# Patient Record
Sex: MALE | Race: WHITE | HISPANIC OR LATINO | Employment: FULL TIME | URBAN - METROPOLITAN AREA
[De-identification: names, ages, dates, MRNs, and addresses within clinical notes are randomized per-mention and may not be internally consistent; named-entity substitution may affect disease eponyms.]

---

## 2020-09-29 ENCOUNTER — APPOINTMENT (OUTPATIENT)
Dept: RADIOLOGY | Facility: AMBULARY SURGERY CENTER | Age: 45
End: 2020-09-29
Attending: ORTHOPAEDIC SURGERY
Payer: COMMERCIAL

## 2020-09-29 VITALS
BODY MASS INDEX: 27.2 KG/M2 | SYSTOLIC BLOOD PRESSURE: 145 MMHG | HEIGHT: 70 IN | WEIGHT: 190 LBS | DIASTOLIC BLOOD PRESSURE: 97 MMHG | TEMPERATURE: 97.2 F

## 2020-09-29 DIAGNOSIS — M79.2 NEUROPATHIC PAIN OF ANKLE, RIGHT: Primary | ICD-10-CM

## 2020-09-29 DIAGNOSIS — R20.0 NUMBNESS OF RIGHT FOOT: ICD-10-CM

## 2020-09-29 DIAGNOSIS — M25.571 PAIN, JOINT, ANKLE AND FOOT, RIGHT: ICD-10-CM

## 2020-09-29 PROCEDURE — 99213 OFFICE O/P EST LOW 20 MIN: CPT | Performed by: ORTHOPAEDIC SURGERY

## 2020-09-29 PROCEDURE — 73610 X-RAY EXAM OF ANKLE: CPT

## 2020-09-29 RX ORDER — TOBRAMYCIN 3 MG/ML
SOLUTION/ DROPS OPHTHALMIC
COMMUNITY
Start: 2020-09-26

## 2020-09-29 RX ORDER — DOXYCYCLINE HYCLATE 100 MG/1
CAPSULE ORAL
COMMUNITY
Start: 2020-09-26

## 2020-09-29 NOTE — PROGRESS NOTES
CONSTANTIN Merino  Attending, Orthopaedic Surgery  Foot and [facility] [address] [facility]        ORTHOPAEDIC FOOT AND ANKLE CLINIC VISIT     Assessment:     Encounter Diagnoses   Name Primary?  Pain, joint, ankle and foot, right     Neuropathic pain of ankle, right Yes    Numbness of right foot               Plan:   · The patient verbalized understanding of exam findings and treatment plan  We engaged in the shared decision-making process and treatment options were discussed at length with the patient  Surgical and conservative management discussed today along with risks and benefits  · Hollie Goodrich has significant weakness in the right ankle, which is contributing to the lateral ligament pain  · We recommend formal PT to focus on ankle strengthening  · We will order an EMG of bilateral lower extremities to evaluate the numbness in his right foot  Return in about 7 weeks (around 11/17/2020)  History of Present Illness:   Chief Complaint:   Chief Complaint   Patient presents with    Right Ankle - Follow-up     Lazaro Bashir is a 54 y o  male who is being seen for right ankle pain and numbness  Patient reports since his most recent surgery in December 2017 to remove his tightrope, he has had diffuse numbness throughout the foot  He did have a nerve block prior to the surgery  He reports ankle pain is localized at anterolateral ankle with minimal radiating and described as sharp and severe  He also reports he feels that the ankle is unstable  Patient has seen pain management at [facility] who 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 and gave him a sympathetic nerve block, which provided no relief  He has also tried acupuncture and medications (lyrica and gabapentin) without relief  Denies history of neuropathy  Patient does not smoke, does not have diabetes and does not take blood thinners    Patient denies family history of anesthesia complications and has not had any complications with anesthesia  Pain/symptom timing:  Worse during the day when active  Pain/symptom context:  Worse with activites and work  Pain/symptom modifying factors:  Rest makes better, activities make worse  Pain/symptom associated signs/symptoms: none    Prior treatment   · NSAIDsYes    · Injections Yes   · Bracing/Orthotics Yes   · Physical Therapy Yes - 6 weeks of PT post-operatively     Orthopedic Surgical History:   July 28, 2017 - Right brostrom and syndesmosis fixation with tightrope    December 1, 2017 - Right ankle removal of tightrope    Past Medical, Surgical and Social History:  Past Medical History:  has no past medical history on file  Problem List: does not have a problem list on file  Past Surgical History:  has a past surgical history that includes Tonsillectomy; Appendectomy; and Ankle surgery  Family History: family history is not on file  Social History:  reports that he has never smoked  He has never used smokeless tobacco  No history on file for alcohol and drug  Current Medications: has a current medication list which includes the following prescription(s): doxycycline hyclate and tobramycin  Allergies: is allergic to cefaclor; metoclopramide; morphine; and clarithromycin       Review of Systems:  General- denies fever/chills  HEENT- denies hearing loss or sore throat  Eyes- denies eye pain or visual disturbances, denies red eyes  Respiratory- denies cough or SOB  Cardio- denies chest pain or palpitations  GI- denies abdominal pain  Endocrine- denies urinary frequency  Urinary- denies pain with urination  Musculoskeletal- Negative except noted above  Skin- denies rashes or wounds  Neurological- denies dizziness or headache  Psychiatric- denies anxiety or difficulty concentrating    Physical Exam:   /97 (BP Location: Right arm, Patient Position: Sitting, Cuff Size: Adult)   Temp (!) 97 2 °F (36 2 °C)   Ht 5' 10" (1 778 m)   Wt 86 2 kg (190 lb)   BMI 27 26 kg/m² General/Constitutional: No apparent distress: well-nourished and well developed  Eyes: normal ocular motion  Cardio: RRR, Normal S1S2, No m/r/g  Lymphatic: No appreciable lymphadenopathy  Respiratory: Non-labored breathing, CTA b/l no w/c/r  Vascular: No edema, swelling or tenderness, except as noted in detailed exam   Integumentary: No impressive skin lesions present, except as noted in detailed exam   Neuro: No ataxia or tremors noted  Psych: Normal mood and affect, oriented to person, place and time  Appropriate affect  Musculoskeletal: Normal, except as noted in detailed exam and in HPI  Examination    Right    Gait Antalgic   Musculoskeletal Tender to palpation at ATFL    Skin Normal       Nails Normal    Range of Motion  10 degrees dorsiflexion, 40 degrees plantarflexion  Subtalar motion: normal    Stability Stable    Muscle Strength 5/5 tibialis anterior  5/5 gastrocnemius-soleus  5/5 posterior tibialis  5/5 peroneal/eversion strength  5/5 EHL  5/5 FHL    Neurologic Normal    Sensation  Intact to light touch throughout sural, saphenous, superficial peroneal, deep peroneal and medial/lateral plantar nerve distributions  Orange-Luis Alberto 5 07 filament (10g) testing  deferred  Cardiovascular Brisk capillary refill < 2 seconds,intact DP and PT pulses    Special Tests Negative anterior drawer      Imaging Studies:   3 views of the right ankle were taken, reviewed and interpreted independently that demonstrate previous ghost track from the syndesmosis fixation, no other notable finding  Reviewed by me personally  Scribe Attestation    I,:   Amadeo Villagran PA-C am acting as a scribe while in the presence of the attending physician :        I,:   Elias Kaiser MD personally performed the services described in this documentation    as scribed in my presence :              Therese Schneider Lachman, MD  Foot & Ankle Surgery   Department [address]      I personally performed the service  Marlena Slay Lachman, MD

## 2020-09-30 NOTE — TELEPHONE ENCOUNTER
Patient sees Dr Ria Culver  [facility], PT script, and EMG order to be faxed to [phone]        Faxed per request

## 2020-11-17 VITALS
SYSTOLIC BLOOD PRESSURE: 137 MMHG | HEIGHT: 70 IN | DIASTOLIC BLOOD PRESSURE: 93 MMHG | WEIGHT: 208 LBS | BODY MASS INDEX: 29.78 KG/M2 | TEMPERATURE: 96.7 F | HEART RATE: 65 BPM

## 2020-11-17 DIAGNOSIS — M79.2 NEUROPATHIC PAIN OF ANKLE, RIGHT: Primary | ICD-10-CM

## 2020-11-17 DIAGNOSIS — M25.571 PAIN, JOINT, ANKLE AND FOOT, RIGHT: ICD-10-CM

## 2020-11-17 PROCEDURE — 99213 OFFICE O/P EST LOW 20 MIN: CPT | Performed by: ORTHOPAEDIC SURGERY

## 2020-12-08 ENCOUNTER — PROCEDURE VISIT (OUTPATIENT)
Dept: NEUROLOGY | Facility: CLINIC | Age: 45
End: 2020-12-08
Payer: OTHER MISCELLANEOUS

## 2020-12-08 DIAGNOSIS — M79.2 NEUROPATHIC PAIN OF ANKLE, RIGHT: ICD-10-CM

## 2020-12-08 DIAGNOSIS — R20.0 NUMBNESS OF RIGHT FOOT: ICD-10-CM

## 2020-12-08 PROCEDURE — 95886 MUSC TEST DONE W/N TEST COMP: CPT | Performed by: PHYSICAL MEDICINE & REHABILITATION

## 2020-12-08 PROCEDURE — 95911 NRV CNDJ TEST 9-10 STUDIES: CPT | Performed by: PHYSICAL MEDICINE & REHABILITATION

## 2020-12-15 VITALS
HEART RATE: 79 BPM | BODY MASS INDEX: 29.78 KG/M2 | HEIGHT: 70 IN | WEIGHT: 208 LBS | SYSTOLIC BLOOD PRESSURE: 149 MMHG | DIASTOLIC BLOOD PRESSURE: 98 MMHG

## 2020-12-15 DIAGNOSIS — R20.0 NUMBNESS OF RIGHT FOOT: ICD-10-CM

## 2020-12-15 DIAGNOSIS — G54.9 SENSORY NEURONOPATHY: Primary | ICD-10-CM

## 2020-12-15 PROCEDURE — 99213 OFFICE O/P EST LOW 20 MIN: CPT | Performed by: ORTHOPAEDIC SURGERY

## 2020-12-22 ENCOUNTER — TELEPHONE (OUTPATIENT)
Dept: NEUROLOGY | Facility: CLINIC | Age: 45
End: 2020-12-22

## 2021-03-22 ENCOUNTER — CONSULT (OUTPATIENT)
Dept: NEUROLOGY | Facility: CLINIC | Age: 46
End: 2021-03-22
Payer: OTHER MISCELLANEOUS

## 2021-03-22 ENCOUNTER — TELEPHONE (OUTPATIENT)
Dept: NEUROLOGY | Facility: CLINIC | Age: 46
End: 2021-03-22

## 2021-03-22 VITALS
BODY MASS INDEX: 30.64 KG/M2 | HEIGHT: 70 IN | DIASTOLIC BLOOD PRESSURE: 92 MMHG | SYSTOLIC BLOOD PRESSURE: 138 MMHG | WEIGHT: 214 LBS | HEART RATE: 78 BPM

## 2021-03-22 DIAGNOSIS — R20.0 NUMBNESS OF RIGHT FOOT: ICD-10-CM

## 2021-03-22 DIAGNOSIS — R20.0 NUMBNESS AND TINGLING OF RIGHT LEG: ICD-10-CM

## 2021-03-22 DIAGNOSIS — R20.2 NUMBNESS AND TINGLING OF RIGHT LEG: ICD-10-CM

## 2021-03-22 PROCEDURE — 99244 OFF/OP CNSLTJ NEW/EST MOD 40: CPT | Performed by: PSYCHIATRY & NEUROLOGY

## 2021-03-22 RX ORDER — DULOXETIN HYDROCHLORIDE 20 MG/1
20 CAPSULE, DELAYED RELEASE ORAL DAILY
Qty: 30 CAPSULE | Refills: 5 | Status: SHIPPED | OUTPATIENT
Start: 2021-03-22

## 2021-03-22 RX ORDER — CICLOPIROX 1 G/100ML
SHAMPOO TOPICAL 3 TIMES WEEKLY
COMMUNITY
Start: 2021-02-04

## 2021-03-22 NOTE — PROGRESS NOTES
Aye Blackwell is a 54 y o  male  Chief Complaint   Patient presents with    Sensory neuronopathy       Assessment:  1  Numbness and tingling of right leg    2  Numbness of right foot        Plan:   triphasic bone scan    blood work for neuropathy   Cymbalta 20 mg at nighttime   follow-up in 2-3 months    Discussion:   patient's recent EMG results were reviewed, there is diffuse sensory neuropathy with predominant axonal changes, would recommend triphasic bone scan to rule out complex regional pain syndrome, also would recommend blood work for neuropathy, also would recommend blood work, patient to call me after the test to discuss the results, he feels physical therapy with massage therapy helps in and hence a prescription was given, also  We discussed different medication options, he has failed  Lyrica and Neurontin secondary to side effect profile, I have advised him to try Cymbalta side effects of the medication discussed with the patient if he develops any side effects then he will stop the medication, if the above blood work and  Is negative then would recommend patient to see a neuromuscular specialist for a 2nd opinion, to go to the hospital if has any worsening symptoms and call me otherwise to see me back in 2-3 months and follow up with his other physicians      Subjective:    HPI    patient is here for evaluation of numbness and tingling sensation and pain in the right lower extremity mostly from the knee down into the foot, he had work related injury and had surgery to his right ankle about 4 years ago in 2017, since then he has been having numbness and tingling sensation of the right leg involving the foot and the leg up to the right knee associated with pain and numbness and tingling sensation, he denies any focal weakness, symptoms are bothersome to him he has tried pain management including a sympathetic block without much relief according to the patient massage therapy did help him, he did try Lyrica and gabapentin but could not tolerate them secondary to side effects, he denies any symptoms in the left lower extremity or bilateral upper extremities, no bowel and bladder incontinence, no other complaints  Vitals:    03/22/21 0915   BP: 138/92   BP Location: Left arm   Patient Position: Sitting   Cuff Size: Adult   Pulse: 78   Weight: 97 1 kg (214 lb)   Height: 5' 10" (1 778 m)       Current Medications    Current Outpatient Medications:     Ciclopirox 1 % shampoo, 3 (three) times a week, Disp: , Rfl:     doxycycline hyclate (VIBRAMYCIN) 100 mg capsule, , Disp: , Rfl:     tobramycin (TOBREX) 0 3 % SOLN, , Disp: , Rfl:       Allergies  Cefaclor, Metoclopramide, Morphine, Clarithromycin, and Food allergy  [apple flavor]    Past Medical History  History reviewed  No pertinent past medical history  Past Surgical History:  Past Surgical History:   Procedure Laterality Date    ANKLE SURGERY      APPENDECTOMY      TONSILLECTOMY           Family History:  Family History   Problem Relation Age of Onset    Multiple sclerosis Mother     Hypertension Father     Lung cancer Maternal Grandfather        Social History:   reports that he has never smoked  He has never used smokeless tobacco  He reports current alcohol use  He reports that he does not use drugs  I have reviewed the past medical history, surgical history, social and family history, current medications, allergies vitals, review of systems, and updated this information as appropriate today  Objective:    Physical Exam    Neurological Exam    GENERAL:  Cooperative in no acute distress  Well-developed and well-nourished    HEAD and NECK   Head is atraumatic normocephalic with no lesions or masses  Neck is supple with full range of motion    CARDIOVASCULAR  Carotid Arteries-no carotid bruits      NEUROLOGIC:  Mental Status-the patient is awake alert and oriented without aphasia or apraxia  Cranial Nerves: Visual fields are full to confrontation  Visual acuity is 20/20 in  Both eyes with hand-held chart  Extraocular movements are full without nystagmus  Pupils are 2-1/2 mm and reactive  Face is symmetrical to light touch  Movements of facial expression move symmetrically  Hearing is normal to finger rub bilaterally  Soft palate lifts symmetrically  Shoulder shrug is symmetrical  Tongue is midline without atrophy  Motor: No drift is noted on arm extension  Strength is full in the upper and lower extremities with normal bulk and tone  Sensory: Intact to temperature and vibratory sensation in the upper and lower extremities bilaterally  Except for decreased temperature sensation in bilateral lower extremities in a stocking distribution Cortical function is intact  Coordination: Finger to nose testing is performed accurately  Romberg is negative  Gait reveals a normal base with symmetrical arm swing  Tandem walk is normal   Reflexes:     2+ and symmetrical   Toes are downgoing   no spine tenderness          ROS:  Review of Systems   Constitutional: Negative for appetite change, fatigue and fever  HENT: Negative for drooling, ear pain, tinnitus, trouble swallowing and voice change  Eyes: Negative for photophobia, pain and visual disturbance  Respiratory: Negative for chest tightness and shortness of breath  Cardiovascular: Negative for chest pain, palpitations and leg swelling  Gastrointestinal: Negative for abdominal pain, constipation, diarrhea, nausea and vomiting  Endocrine: Negative for cold intolerance and heat intolerance  Genitourinary: Negative for difficulty urinating, frequency and urgency  Musculoskeletal: Negative for back pain, gait problem, joint swelling, myalgias and neck pain  Skin: Negative for rash  Neurological: Positive for numbness (And tingling on right leg)  Negative for dizziness, tremors, seizures, syncope, facial asymmetry, speech difficulty, weakness, light-headedness and headaches  Psychiatric/Behavioral: Negative for agitation, behavioral problems, confusion, decreased concentration, dysphoric mood and sleep disturbance  The patient is not nervous/anxious and is not hyperactive

## 2021-03-22 NOTE — TELEPHONE ENCOUNTER
TOÑO request received from [employer] - faxed to [facility] and scanned into Media

## 2021-03-23 ENCOUNTER — EVALUATION (OUTPATIENT)
Dept: PHYSICAL THERAPY | Age: 46
End: 2021-03-23
Payer: OTHER MISCELLANEOUS

## 2021-03-23 DIAGNOSIS — R20.2 NUMBNESS AND TINGLING OF RIGHT LEG: ICD-10-CM

## 2021-03-23 DIAGNOSIS — R20.0 NUMBNESS OF RIGHT FOOT: Primary | ICD-10-CM

## 2021-03-23 DIAGNOSIS — R20.0 NUMBNESS AND TINGLING OF RIGHT LEG: ICD-10-CM

## 2021-03-23 PROCEDURE — 97161 PT EVAL LOW COMPLEX 20 MIN: CPT | Performed by: PHYSICAL THERAPIST

## 2021-03-23 PROCEDURE — 97110 THERAPEUTIC EXERCISES: CPT | Performed by: PHYSICAL THERAPIST

## 2021-03-23 PROCEDURE — 97140 MANUAL THERAPY 1/> REGIONS: CPT | Performed by: PHYSICAL THERAPIST

## 2021-03-23 NOTE — PROGRESS NOTES
PT Evaluation     Today's date: 3/23/2021  Patient name: Merari Rosales  :   MRN: [id number]  Referring provider: Nae Morales MD  Dx:   Encounter Diagnosis     ICD-10-CM    1  Numbness of right foot  R20 0 PT plan of care cert/re-cert   2  Numbness and tingling of right leg  R20 0 Ambulatory referral to Physical Therapy    R20 2 PT plan of care cert/re-cert       Start Time: 0930  Stop Time: 1020  Total time in clinic (min): 50 minutes    Assessment  Assessment details: Merari Rosales is a 54 y o  male who presents with complaints of Numbness of right foot  (primary encounter diagnosis)  Numbness and tingling of right leg  No further referral appears necessary at this time based upon examination results  Patient is presenting with sciatic nerve irritation leading to numbness/tingling lower aspect of leg limiting walking and causing sleep disturbances  Patient educated on findings and role of therapy  Prognosis is good given HEP compliance and PT 1-2/wk  Positive prognostic indicators include positive attitude toward recovery  Please contact me if you have any questions or recommendations  Thank you for the opportunity to share in [facility]'s care       Impairments: abnormal or restricted ROM, abnormal movement and lacks appropriate home exercise program    Symptom irritability: lowUnderstanding of Dx/Px/POC: good   Prognosis: good    Plan  Patient would benefit from: skilled physical therapy  Planned therapy interventions: joint mobilization, manual therapy, patient education, postural training, strengthening, stretching, therapeutic activities, therapeutic exercise, home exercise program, neuromuscular re-education, flexibility, functional ROM exercises, abdominal trunk stabilization and balance  Frequency: 2x week  Duration in weeks: 8  Treatment plan discussed with: patient        Subjective Evaluation    History of Present Illness  Date of onset: 3/29/2017  Mechanism of injury: Patient jumped as he was chasing a thalia landing 6 feet off ground  Went to [facility] doctor who referred patient to podiatrist then referred to PT for 6 weeks without any improvement  Was then referred to orthopedic surgeon referred him back to therapy for another month without any symptom improvements  Was then referred for MRI  Eventually had surgery and had tightrope procedure performed and then eventually removed  Was casted for 3 months  Sutures eventually removed  Severe pain with suture removal  Reports numbness from fibular head down to top of foot  Had acupunture without improvement nor with sympathetic nerve block to lumbar region  Reports 2-3 days relief following massage but was temporary  Feels better with moving around  Sleeping has improved but will wake patient in middle of night  Patient denies bowel/bladder dysfunction, saddle paraesthesias, nor cough/sneeze provocation  Recurrent probem    Quality of life: good    Pain  No pain reported  Quality: sharp, burning, needle-like and knife-like (swelling)  Aggravating factors: standing, walking and sitting  Progression: no change    Treatments  Previous treatment: medication, physical therapy, immobilization and massage  Current treatment: physical therapy  Patient Goals  Patient goal: decrease numbness/tingling        Objective    GAIT:wnl  Squat assess: wnl  SLS: RLE: n/t, LLE: n/t           MMT    Hip         R          L   Flex  5 5   Extn  5 5   Abd  5 5   Add  5 5                 MMT         AROM    Knee      R          L         R          L   Flex  5 5 WNL WNL   Extn  5 5 WNL WNL                       MMT          AROM    Ankle         R          L          R         L   PF 4 5 WFL WFL   DF  5 5 WFL WFL   DF bent knee 5 5     EHL 5 5     Inv  5 5     Ever   5 5     Foot Int  4 5         Palpation: TTP sciatic nerve, tibial nerve, sural nerve  Sensation (pinprick):  (L/R): intact equal dermatomes L2-S2  Sensation: hyperesthesia sural nerve, peroneal nerve  Reflexes:  (L/R) L4: 2+ BLE       S1:  2+ BLE          Babinski=   -   Clonus= -    Slump test: L= -    R=   + (foot symptoms)    Straight leg raise:   L=  -  R= + tibial nerve bias and sural nerve bias    Ankle:  joint findings= deferred    Flowsheet Rows      Most Recent Value   PT/OT G-Codes   Current Score  60   Projected Score  67        Precautions: chronic nerve symptoms    Manuals 3 23            Central gliders L5-S1 FB                                                   Neuro Re-Ed                                                    Ther Ex             Pt edu  FB            Tibial tensioners 2x10                                                                                          Ther Activity                                       Gait Training                                       Modalities                                         Short Term:  1  Pt will demonstrate improved strength by 1/2 grade in 4 weeks  2  Pt will be able to test negative lower limb tension test in 4 weeks  3  Pt will be able to walk > 20 minutes without symptoms in 4 weeks  Long Term:   1  Pt will be independent in their HEP in 8 weeks  2  Pt will be pain free with IADL's in 8 weeks  3  Pt will demonstrate improved FOTO, > 7 in 8 weeks  4  Pt will report no symptoms with sleeping in 8 weeks

## 2021-03-26 ENCOUNTER — OFFICE VISIT (OUTPATIENT)
Dept: PHYSICAL THERAPY | Age: 46
End: 2021-03-26
Payer: OTHER MISCELLANEOUS

## 2021-03-26 DIAGNOSIS — R20.0 NUMBNESS OF RIGHT FOOT: ICD-10-CM

## 2021-03-26 DIAGNOSIS — R20.2 NUMBNESS AND TINGLING OF RIGHT LEG: Primary | ICD-10-CM

## 2021-03-26 DIAGNOSIS — R20.0 NUMBNESS AND TINGLING OF RIGHT LEG: Primary | ICD-10-CM

## 2021-03-26 PROCEDURE — 97110 THERAPEUTIC EXERCISES: CPT | Performed by: PHYSICAL THERAPIST

## 2021-03-26 PROCEDURE — 97140 MANUAL THERAPY 1/> REGIONS: CPT | Performed by: PHYSICAL THERAPIST

## 2021-03-26 NOTE — PROGRESS NOTES
Daily Note     Today's date: 3/26/2021  Patient name: Beck Sims  :   MRN: [id number]  Referring provider: Donato Schmitt MD  Dx:   Encounter Diagnosis     ICD-10-CM    1  Numbness and tingling of right leg  R20 0     R20 2    2  Numbness of right foot  R20 0        Start Time: 0930  Stop Time: 1005  Total time in clinic (min): 35 minutes    Subjective: Patient reports that leg felt better after last session  Reports achiness in right hip today and numbness in foot  Objective: See treatment diary below    Assessment: Tolerated treatment well  Patient reported less numbness/tingling post treatment today  Plan: Continue per plan of care  Precautions: chronic nerve symptoms     Manuals 3 23  3 26                   Central gliders L5-S1 FB  FB                    tibial tens  In S/L    FB                   Navicular tape   FB                                           Neuro Re-Ed                                                                                               Ther Ex                       Pt edu   FB                     Tibial tensioners 2x10  2x10 DF/PF in supine                   recum bike    8'                                                                                                                                           Ther Activity                                                                       Gait Training                                                                       Modalities

## 2021-03-29 ENCOUNTER — HOSPITAL ENCOUNTER (OUTPATIENT)
Dept: NUCLEAR MEDICINE | Facility: HOSPITAL | Age: 46
Discharge: HOME | End: 2021-03-29
Attending: PSYCHIATRY & NEUROLOGY
Payer: OTHER MISCELLANEOUS

## 2021-03-29 ENCOUNTER — HOSPITAL ENCOUNTER (OUTPATIENT)
Dept: NUCLEAR MEDICINE | Facility: HOSPITAL | Age: 46
Discharge: HOME | End: 2021-03-29
Attending: PSYCHIATRY & NEUROLOGY

## 2021-03-29 DIAGNOSIS — R20.2 NUMBNESS AND TINGLING OF RIGHT LEG: ICD-10-CM

## 2021-03-29 DIAGNOSIS — R20.0 NUMBNESS AND TINGLING OF RIGHT LEG: ICD-10-CM

## 2021-03-29 PROCEDURE — 78315 BONE IMAGING 3 PHASE: CPT

## 2021-03-29 PROCEDURE — G1004 CDSM NDSC: HCPCS

## 2021-03-29 PROCEDURE — A9503 TC99M MEDRONATE: HCPCS

## 2021-03-30 ENCOUNTER — OFFICE VISIT (OUTPATIENT)
Dept: PHYSICAL THERAPY | Age: 46
End: 2021-03-30
Payer: OTHER MISCELLANEOUS

## 2021-03-30 DIAGNOSIS — R20.0 NUMBNESS OF RIGHT FOOT: ICD-10-CM

## 2021-03-30 DIAGNOSIS — R20.2 NUMBNESS AND TINGLING OF RIGHT LEG: Primary | ICD-10-CM

## 2021-03-30 DIAGNOSIS — R20.0 NUMBNESS AND TINGLING OF RIGHT LEG: Primary | ICD-10-CM

## 2021-03-30 PROCEDURE — 97110 THERAPEUTIC EXERCISES: CPT | Performed by: PHYSICAL THERAPIST

## 2021-03-30 PROCEDURE — 97140 MANUAL THERAPY 1/> REGIONS: CPT | Performed by: PHYSICAL THERAPIST

## 2021-03-30 NOTE — PROGRESS NOTES
Daily Note     Today's date: 3/30/2021  Patient name: Kasey Jo  :   MRN: [id number]  Referring provider: León Donato MD  Dx:   Encounter Diagnosis     ICD-10-CM    1  Numbness and tingling of right leg  R20 0     R20 2    2  Numbness of right foot  R20 0        Start Time: 0845        Subjective: Patient reports that leg is doing about the same  Felt better after tape  Reports leg feels stiff today  Objective: See treatment diary below    Assessment: Tolerated treatment fair  Slight improvement in dorsal aspect of the foot symptoms  Plan: Continue per plan of care  Precautions: chronic nerve symptoms     Manuals 3 23  3 26  3 30      Central gliders L5-S1 FB  FB  FB       tibial tens  In S/L    FB  FB      Navicular tape   FB  FB                   Neuro Re-Ed                                                   Ther Ex            Pt edu   FB          Tibial tensioners 2x10  2x10 DF/PF in supine  common peroneal 2x10      recum bike    8'  10'       vigor gym      5', lvl 8                                                          Ther Activity                                      Gait Training                                      Modalities

## 2021-04-01 ENCOUNTER — OFFICE VISIT (OUTPATIENT)
Dept: PHYSICAL THERAPY | Age: 46
End: 2021-04-01
Payer: OTHER MISCELLANEOUS

## 2021-04-01 DIAGNOSIS — R20.2 NUMBNESS AND TINGLING OF RIGHT LEG: Primary | ICD-10-CM

## 2021-04-01 DIAGNOSIS — R20.0 NUMBNESS OF RIGHT FOOT: ICD-10-CM

## 2021-04-01 DIAGNOSIS — R20.0 NUMBNESS AND TINGLING OF RIGHT LEG: Primary | ICD-10-CM

## 2021-04-01 PROCEDURE — 97140 MANUAL THERAPY 1/> REGIONS: CPT | Performed by: PHYSICAL THERAPIST

## 2021-04-01 PROCEDURE — 97110 THERAPEUTIC EXERCISES: CPT | Performed by: PHYSICAL THERAPIST

## 2021-04-01 NOTE — PROGRESS NOTES
Daily Note     Today's date: 2021  Patient name: Donnie Peters  :   MRN: [id number]  Referring provider: David Marvin MD  Dx:   Encounter Diagnosis     ICD-10-CM    1  Numbness and tingling of right leg  R20 0     R20 2    2  Numbness of right foot  R20 0        Start Time: 1445  Stop Time: 1530  Total time in clinic (min): 45 minutes  Subjective: Patient reports that he is having cramping at times in the bottom of his foot  Objective: See treatment diary below    Assessment: Tolerated treatment well  Reported absence of symptoms following treatment today with NP-STM  Plan: Continue per plan of care  Precautions: chronic nerve symptoms     Manuals 3 23  3 26  3 30  4 2    Central gliders L5-S1 FB  FB  FB       tibial tens  In S/L    FB  FB  FB    Navicular tape   FB  FB      NP-STM sciatic/peroneal    FB                                 Neuro Re-Ed                                                   Ther Ex            Pt edu   FB          Tibial tensioners 2x10  2x10 DF/PF in supine  common peroneal 2x10      recum bike    8'  10'  10'     vigor gym      5', lvl 8  5', lvl 9                                                        Ther Activity                                      Gait Training                                      Modalities

## 2021-04-06 ENCOUNTER — OFFICE VISIT (OUTPATIENT)
Dept: PHYSICAL THERAPY | Age: 46
End: 2021-04-06
Payer: OTHER MISCELLANEOUS

## 2021-04-06 DIAGNOSIS — R20.0 NUMBNESS AND TINGLING OF RIGHT LEG: Primary | ICD-10-CM

## 2021-04-06 DIAGNOSIS — R20.2 NUMBNESS AND TINGLING OF RIGHT LEG: Primary | ICD-10-CM

## 2021-04-06 DIAGNOSIS — R20.0 NUMBNESS OF RIGHT FOOT: ICD-10-CM

## 2021-04-06 PROCEDURE — 97140 MANUAL THERAPY 1/> REGIONS: CPT | Performed by: PHYSICAL THERAPIST

## 2021-04-06 PROCEDURE — 97110 THERAPEUTIC EXERCISES: CPT | Performed by: PHYSICAL THERAPIST

## 2021-04-06 NOTE — PROGRESS NOTES
Daily Note     Today's date: 2021  Patient name: Kandi Barreto  :   MRN: [id number]  Referring provider: Andrzej Otoole MD  Dx:   Encounter Diagnosis     ICD-10-CM    1  Numbness and tingling of right leg  R20 0     R20 2    2  Numbness of right foot  R20 0                 Subjective: Patient reports that he has not been having cramping in his foot lately but has been having right gluteal discomfort and water sensation in his foot  Reported feeling better after last treatment  Objective: See treatment diary below    Assessment: Tolerated treatment well  Reported absence of symptoms following treatment today with NP-STM  Still presenting with significant neural tension RLE  Plan: Continue per plan of care  Precautions: chronic nerve symptoms     Manuals 3 23  3 26  3 30  4 2 4 6   Central gliders L5-S1 FB  FB  FB   FB    tibial tens  In S/L    FB  FB  FB FB   Navicular tape   FB  FB      NP-STM sciatic/peroneal    FB FB                                Neuro Re-Ed                                                   Ther Ex            Pt edu   FB          Tibial tensioners 2x10  2x10 DF/PF in supine  common peroneal 2x10   2*10 sliders   recum bike    8'  10'  10' 10'    vigor gym      5', lvl 8  5', lvl 9     eversion         BTB- HEP                                          Ther Activity                                      Gait Training                                      Modalities

## 2021-04-08 ENCOUNTER — OFFICE VISIT (OUTPATIENT)
Dept: PHYSICAL THERAPY | Age: 46
End: 2021-04-08
Payer: OTHER MISCELLANEOUS

## 2021-04-08 DIAGNOSIS — R20.2 NUMBNESS AND TINGLING OF RIGHT LEG: Primary | ICD-10-CM

## 2021-04-08 DIAGNOSIS — R20.0 NUMBNESS OF RIGHT FOOT: ICD-10-CM

## 2021-04-08 DIAGNOSIS — R20.0 NUMBNESS AND TINGLING OF RIGHT LEG: Primary | ICD-10-CM

## 2021-04-08 PROCEDURE — 97110 THERAPEUTIC EXERCISES: CPT | Performed by: PHYSICAL THERAPIST

## 2021-04-08 PROCEDURE — 97140 MANUAL THERAPY 1/> REGIONS: CPT | Performed by: PHYSICAL THERAPIST

## 2021-04-08 NOTE — PROGRESS NOTES
Daily Note     Today's date: 2021  Patient name: Donnie Peters  : [id number]  MRN: [id number]  Referring provider: David Marvin MD  Dx:   Encounter Diagnosis     ICD-10-CM    1  Numbness and tingling of right leg  R20 0     R20 2    2  Numbness of right foot  R20 0        Start Time:   Stop Time:   Total time in clinic (min): 45 minutes  Subjective: Patient reports that he has not been having cramping in his foot lately but has been having right gluteal discomfort and water sensation in his foot  Reported feeling better after last treatment  Objective: See treatment diary below    Assessment: Tolerated treatment well  Reported feeling better post treatment  Plan: Continue per plan of care  Precautions: chronic nerve symptoms     Manuals 3 23  3 26  3 30  4 2 4 6 4 8   Central gliders L5-S1 FB  FB  FB   FB FB    tibial tens  In S/L    FB  FB  FB FB FB   Navicular tape   FB  FB       NP-STM sciatic/peroneal    FB FB FB                                   Neuro Re-Ed                                                       Ther Ex             Pt edu   FB           Tibial tensioners 2x10  2x10 DF/PF in supine  common peroneal 2x10   2*10 sliders 2*10 sliders   recum bike    8'  10'  10' 10' 10'    vigor gym      5', lvl 8  5', lvl 9      eversion         BTB- HEP                                              Ther Activity                                         Gait Training                                         Modalities

## 2021-04-13 ENCOUNTER — OFFICE VISIT (OUTPATIENT)
Dept: PHYSICAL THERAPY | Age: 46
End: 2021-04-13
Payer: OTHER MISCELLANEOUS

## 2021-04-13 DIAGNOSIS — R20.2 NUMBNESS AND TINGLING OF RIGHT LEG: Primary | ICD-10-CM

## 2021-04-13 DIAGNOSIS — R20.0 NUMBNESS AND TINGLING OF RIGHT LEG: Primary | ICD-10-CM

## 2021-04-13 DIAGNOSIS — R20.0 NUMBNESS OF RIGHT FOOT: ICD-10-CM

## 2021-04-13 PROCEDURE — 97110 THERAPEUTIC EXERCISES: CPT | Performed by: PHYSICAL THERAPIST

## 2021-04-13 PROCEDURE — 97140 MANUAL THERAPY 1/> REGIONS: CPT | Performed by: PHYSICAL THERAPIST

## 2021-04-13 NOTE — PROGRESS NOTES
Daily Note     Today's date: 2021  Patient name: Dimitri Larose  : [birth year]  MRN: [id number]  Referring provider: Destiny Gómez MD  Dx:   Encounter Diagnosis     ICD-10-CM    1  Numbness and tingling of right leg  R20 0     R20 2    2  Numbness of right foot  R20 0                 Subjective: Patient reports having gluteal discomfort entering treatment with pain in left foot  Objective: See treatment diary below    Assessment: Patient reports that he felt better post treatment  Educated on exercise performance at home to avoid stressing sciatic nerve  Plan: Continue per plan of care  Precautions: chronic nerve symptoms     Manuals  3 30  4 2 4 6 4 8 4 13   Central gliders L5-S1  FB   FB FB FB    tibial tens   In S/L  FB  FB FB FB FB   Navicular tape  FB        NP-STM sciatic/peroneal  FB FB FB FB                      Neuro Re-Ed                                           Ther Ex       4 13   Pt edu           Tibial tensioners  common peroneal 2x10   2*10 sliders 2*10 sliders 2*10 tensioner   recum bike   10'  10' 10' 10' 10'    vigor gym  5', lvl 8  5', lvl 9       eversion     BTB- HEP                                      Ther Activity                                Gait Training                                Modalities                           See result note

## 2021-04-15 ENCOUNTER — OFFICE VISIT (OUTPATIENT)
Dept: PHYSICAL THERAPY | Age: 46
End: 2021-04-15
Payer: OTHER MISCELLANEOUS

## 2021-04-15 DIAGNOSIS — R20.0 NUMBNESS OF RIGHT FOOT: ICD-10-CM

## 2021-04-15 DIAGNOSIS — R20.0 NUMBNESS AND TINGLING OF RIGHT LEG: Primary | ICD-10-CM

## 2021-04-15 DIAGNOSIS — R20.2 NUMBNESS AND TINGLING OF RIGHT LEG: Primary | ICD-10-CM

## 2021-04-15 PROCEDURE — 97110 THERAPEUTIC EXERCISES: CPT | Performed by: PHYSICAL THERAPIST

## 2021-04-15 PROCEDURE — 97140 MANUAL THERAPY 1/> REGIONS: CPT | Performed by: PHYSICAL THERAPIST

## 2021-04-15 NOTE — PROGRESS NOTES
Daily Note     Today's date: 4/15/2021  Patient name: Neema Mclaughlin  :   MRN: [id number]  Referring provider: Rhea Mcnamara MD  Dx:   Encounter Diagnosis     ICD-10-CM    1  Numbness and tingling of right leg  R20 0     R20 2    2  Numbness of right foot  R20 0        Start Time:   Stop Time: 1900  Total time in clinic (min): 45 minutes  Subjective: Patient reports not having gluteal discomfort entering treatment today  Reports foot is doing okay with numbness in dorsum of foot  Objective: See treatment diary below    Assessment: Patient reported feeling better in his foot post treatment  Improved gait pattern demonstrated  Plan: Continue per plan of care  Precautions: chronic nerve symptoms     Manuals  3 30  4 2 4 6 4 8 4 13 4 15   Central gliders L5-S1  FB   FB FB FB FB    tibial tens   In S/L  FB  FB FB FB FB FB   Navicular tape  FB         NP-STM sciatic/peroneal  FB FB FB FB FB   rocktape sciatic nerve      FB               Neuro Re-Ed                                               Ther Ex       4 13    Pt edu            Tibial tensioners  common peroneal 2x10   2*10 sliders 2*10 sliders 2*10 tensioner    recum bike   10'  10' 10' 10' 10' 10'    vigor gym  5', lvl 8  5', lvl 9        eversion     BTB- HEP                                          Ther Activity                                   Gait Training                                   Modalities

## 2021-04-20 ENCOUNTER — OFFICE VISIT (OUTPATIENT)
Dept: PHYSICAL THERAPY | Age: 46
End: 2021-04-20
Payer: OTHER MISCELLANEOUS

## 2021-04-20 DIAGNOSIS — R20.0 NUMBNESS AND TINGLING OF RIGHT LEG: Primary | ICD-10-CM

## 2021-04-20 DIAGNOSIS — R20.2 NUMBNESS AND TINGLING OF RIGHT LEG: Primary | ICD-10-CM

## 2021-04-20 DIAGNOSIS — R20.0 NUMBNESS OF RIGHT FOOT: ICD-10-CM

## 2021-04-20 PROCEDURE — 97140 MANUAL THERAPY 1/> REGIONS: CPT | Performed by: PHYSICAL THERAPIST

## 2021-04-20 PROCEDURE — 97110 THERAPEUTIC EXERCISES: CPT | Performed by: PHYSICAL THERAPIST

## 2021-04-20 NOTE — PROGRESS NOTES
Daily Note     Today's date: 2021  Patient name: Clari Kennedy  : [birth year]  MRN: [id number]  Referring provider: Suzanne Gastelum MD  Dx:   Encounter Diagnosis     ICD-10-CM    1  Numbness and tingling of right leg  R20 0     R20 2    2  Numbness of right foot  R20 0                 Subjective: Patient reports that symptoms were absent for 2 days with taping intervention after last visit  Objective: See treatment diary below    Assessment: Patient reported feeling less tingling in symptoms post treatment  Plan: Continue per plan of care  Precautions: chronic nerve symptoms     Manuals 4 6 4 8 4 13 4 15 4 20   Central gliders L5-S1 FB FB FB FB     tibial tens  In S/L FB FB FB FB FB   Navicular tape        NP-STM sciatic/peroneal FB FB FB FB FB   rocktape sciatic nerve    FB FB            Neuro Re-Ed                                   Ther Ex   4 13     Pt edu          Tibial tensioners 2*10 sliders 2*10 sliders 2*10 tensioner  2*10 tensioner   recum bike  10' 10' 10' 10' 10'    vigor gym         eversion BTB- HEP                                  Ther Activity                          Gait Training                          Modalities

## 2021-04-22 ENCOUNTER — OFFICE VISIT (OUTPATIENT)
Dept: PHYSICAL THERAPY | Age: 46
End: 2021-04-22
Payer: OTHER MISCELLANEOUS

## 2021-04-22 DIAGNOSIS — R20.0 NUMBNESS OF RIGHT FOOT: ICD-10-CM

## 2021-04-22 DIAGNOSIS — R20.0 NUMBNESS AND TINGLING OF RIGHT LEG: Primary | ICD-10-CM

## 2021-04-22 DIAGNOSIS — R20.2 NUMBNESS AND TINGLING OF RIGHT LEG: Primary | ICD-10-CM

## 2021-04-22 PROCEDURE — 97140 MANUAL THERAPY 1/> REGIONS: CPT | Performed by: PHYSICAL THERAPIST

## 2021-04-22 PROCEDURE — 97110 THERAPEUTIC EXERCISES: CPT | Performed by: PHYSICAL THERAPIST

## 2021-04-22 NOTE — PROGRESS NOTES
Daily Note     Today's date: 2021  Patient name: Sesar Pelletier  : [birth year]  MRN: [id number]  Referring provider: Terese Kasper MD  Dx:   Encounter Diagnosis     ICD-10-CM    1  Numbness and tingling of right leg  R20 0     R20 2    2  Numbness of right foot  R20 0        Start Time: 0850  Stop Time: 930  Total time in clinic (min): 40 minutes  Subjective: Continues to report temporary relief with treatment  Symptoms return when tape is off, he will be buying cupping kit to use at home  Objective: See treatment diary below    Assessment: Patient reported no symptoms post treatment with manual intervention  Plan: Continue per plan of care  Precautions: chronic nerve symptoms     Manuals 4 13 4 15 4 20 4 22   Central gliders L5-S1 FB FB      tibial tens  In S/L FB FB FB FB   Navicular tape       NP-STM sciatic/peroneal FB FB FB FB   rocktape sciatic nerve  FB FB FB    IASTM peroneal n     FB   Neuro Re-Ed                               Ther Ex 4 13      Pt edu         Tibial tensioners 2*10 tensioner  2*10 tensioner    recum bike  10' 10' 10' 10'                                         Ther Activity                       Gait Training                       Modalities

## 2021-04-27 ENCOUNTER — OFFICE VISIT (OUTPATIENT)
Dept: PHYSICAL THERAPY | Age: 46
End: 2021-04-27
Payer: OTHER MISCELLANEOUS

## 2021-04-27 DIAGNOSIS — R20.2 NUMBNESS AND TINGLING OF RIGHT LEG: Primary | ICD-10-CM

## 2021-04-27 DIAGNOSIS — R20.0 NUMBNESS OF RIGHT FOOT: ICD-10-CM

## 2021-04-27 DIAGNOSIS — R20.0 NUMBNESS AND TINGLING OF RIGHT LEG: Primary | ICD-10-CM

## 2021-04-27 PROCEDURE — 97110 THERAPEUTIC EXERCISES: CPT | Performed by: PHYSICAL THERAPIST

## 2021-04-27 PROCEDURE — 97140 MANUAL THERAPY 1/> REGIONS: CPT | Performed by: PHYSICAL THERAPIST

## 2021-04-27 NOTE — PROGRESS NOTES
Daily Note     Today's date: 2021  Patient name: Delfina Calabrese  :   MRN: [id number]  Referring provider: Taisha Wilkins MD  Dx:   Encounter Diagnosis     ICD-10-CM    1  Numbness and tingling of right leg  R20 0     R20 2    2  Numbness of right foot  R20 0        Start Time: 1800  Stop Time: 1845  Total time in clinic (min): 45 minutes    Subjective: Pt reports improvement with symptoms  Objective: See treatment diary below      Assessment: Tolerated treatment well  Manual added to include lumbar gapping on right side to relieve neural tension  Pt experienced positive results  Patient demonstrated fatigue post treatment and would benefit from continued PT      Plan: Continue per plan of care  Manuals 4 27   4 22   Central gliders L5-S1 JF with lumbar gapping       tibial tens  In S/L JF   FB   Navicular tape       NP-STM sciatic/peroneal JF   FB   rocktape sciatic nerve JF   FB    IASTM peroneal n  JF   FB   Neuro Re-Ed                               Ther Ex 4 27      Pt edu         Tibial tensioners 2*10 tensioner      recum bike  10'   10'                                         Ther Activity                       Gait Training                       Modalities

## 2021-04-29 ENCOUNTER — OFFICE VISIT (OUTPATIENT)
Dept: PHYSICAL THERAPY | Age: 46
End: 2021-04-29
Payer: OTHER MISCELLANEOUS

## 2021-04-29 DIAGNOSIS — R20.0 NUMBNESS OF RIGHT FOOT: ICD-10-CM

## 2021-04-29 DIAGNOSIS — R20.0 NUMBNESS AND TINGLING OF RIGHT LEG: Primary | ICD-10-CM

## 2021-04-29 DIAGNOSIS — R20.2 NUMBNESS AND TINGLING OF RIGHT LEG: Primary | ICD-10-CM

## 2021-04-29 PROCEDURE — 97140 MANUAL THERAPY 1/> REGIONS: CPT | Performed by: PHYSICAL THERAPIST

## 2021-04-29 PROCEDURE — 97110 THERAPEUTIC EXERCISES: CPT | Performed by: PHYSICAL THERAPIST

## 2021-04-29 NOTE — PROGRESS NOTES
Daily Note     Today's date: 2021  Patient name: Bird Casey  :   MRN: [id number]  Referring provider: Ashleigh Marshall MD  Dx:   Encounter Diagnosis     ICD-10-CM    1  Numbness and tingling of right leg  R20 0     R20 2    2  Numbness of right foot  R20 0        Start Time: 1800  Stop Time: 1848  Total time in clinic (min): 48 minutes    Subjective: Pt reports improvement in symptoms following treatment but symptoms return 2 days later  Objective: See treatment diary below    Assessment: Tolerated treatment well  Patient reported feeling better post session and educated on use of NP-STM at home  Plan: Continue per plan of care  Manuals 4 27 4 29  4 22   Central gliders L5-S1 JF with lumbar gapping FB       tibial tens  In S/L JF FB  FB   Navicular tape       NP-STM sciatic/peroneal JF FB  FB   rocktape sciatic nerve JF FB  FB    IASTM peroneal n  JF FB  FB   Neuro Re-Ed                               Ther Ex 4 27 4 29     Pt edu         Tibial tensioners 2*10 tensioner 2*10 tensioner     recum bike  10' 10'  10'                                         Ther Activity                       Gait Training                       Modalities

## 2021-05-06 ENCOUNTER — OFFICE VISIT (OUTPATIENT)
Dept: PHYSICAL THERAPY | Age: 46
End: 2021-05-06
Payer: OTHER MISCELLANEOUS

## 2021-05-06 DIAGNOSIS — R20.0 NUMBNESS OF RIGHT FOOT: ICD-10-CM

## 2021-05-06 DIAGNOSIS — R20.2 NUMBNESS AND TINGLING OF RIGHT LEG: Primary | ICD-10-CM

## 2021-05-06 DIAGNOSIS — R20.0 NUMBNESS AND TINGLING OF RIGHT LEG: Primary | ICD-10-CM

## 2021-05-06 PROCEDURE — 97140 MANUAL THERAPY 1/> REGIONS: CPT | Performed by: PHYSICAL THERAPIST

## 2021-05-06 PROCEDURE — 97112 NEUROMUSCULAR REEDUCATION: CPT | Performed by: PHYSICAL THERAPIST

## 2021-05-06 NOTE — PROGRESS NOTES
Daily Note     Today's date: 2021  Patient name: Judith Cortez  :   MRN: [id number]  Referring provider: Rajesh Valdes MD  Dx:   Encounter Diagnosis     ICD-10-CM    1  Numbness and tingling of right leg  R20 0     R20 2    2  Numbness of right foot  R20 0        Start Time: 1800  Stop Time: 1850  Total time in clinic (min): 50 minutes    Subjective: Pt reports right gluteal discomfort entering treatment with fatigue in right foot with ADLs  Objective: See treatment diary below  SLS: RLE < 15 sec   7 single leg calf raise with correct form, 20 LLE      Assessment: Tolerated treatment fair  Moderate sway and ankle strategy demonstrated with single leg based exercises  Fatigue noted with calf raise exercise  Plan: Continue per plan of care  Manuals 4 27 4 29 5 6  4 22   Central gliders L5-S1 JF with lumbar gapping FB  FB      tibial tens  In S/L JF FB FB  FB   Navicular tape        NP-STM sciatic/peroneal JF FB   FB   rocktape sciatic nerve JF FB   FB    IASTM peroneal n  JF FB FB  FB   Neuro Re-Ed        Fwd heel/side tap   2*10     rockerboard   2*10 A/P lvl 2     rockerboard   2*10 m/l lvl 2     S/L deadlift   2*10                               Ther Ex 4 27 4 29 5 6     Pt edu          Tibial tensioners 2*10 tensioner 2*10 tensioner      recum bike  10' 10'   10'                                              Ther Activity                          Gait Training                          Modalities

## 2021-05-11 ENCOUNTER — OFFICE VISIT (OUTPATIENT)
Dept: PHYSICAL THERAPY | Age: 46
End: 2021-05-11
Payer: OTHER MISCELLANEOUS

## 2021-05-11 DIAGNOSIS — R20.0 NUMBNESS OF RIGHT FOOT: ICD-10-CM

## 2021-05-11 DIAGNOSIS — R20.0 NUMBNESS AND TINGLING OF RIGHT LEG: Primary | ICD-10-CM

## 2021-05-11 DIAGNOSIS — R20.2 NUMBNESS AND TINGLING OF RIGHT LEG: Primary | ICD-10-CM

## 2021-05-11 PROCEDURE — 97140 MANUAL THERAPY 1/> REGIONS: CPT

## 2021-05-11 NOTE — PROGRESS NOTES
Daily Note     Today's date: 2021  Patient name: Vince Zarco  :   MRN: [id number]  Referring provider: Margot Mujica MD  Dx:   Encounter Diagnosis     ICD-10-CM    1  Numbness and tingling of right leg  R20 0     R20 2    2  Numbness of right foot  R20 0                   Subjective: pt reports he has more pain in his R leg than he had previously, pt reports no increased activity over the weekend, pt reports PAS 6/10 over the weekend and now a dull ache, pt reports he didn't have the pain when his leg was taped,  "It feels like a knot in there"    Objective: See treatment diary below      Assessment: pt had some relief with manual intervention today, will cont to monitor sx and progress functional ex as mone      Plan: Continue per plan of care  Progress treatment as tolerated  Manuals 4 27 4 29 5 6 21   Central gliders L5-S1 JF with lumbar gapping FB  FB     tibial tens  In S/L JF FB FB VK   Navicular tape       NP-STM sciatic/peroneal JF FB     rocktape sciatic nerve JF FB  FB   IASTM R proximal hamstring insertion    VK   Sciatic nerve glides    VK    IASTM peroneal n  JF FB FB    Neuro Re-Ed       Fwd heel/side tap   2*10    rockerboard   2*10 A/P lvl 2    rockerboard   2*10 m/l lvl 2    S/L deadlift   2*10    Standing short foot        ball toss rebounder                Ther Ex 4 27 4 29 5 6    Pt edu         Tibial tensioners 2*10 tensioner 2*10 tensioner     recum bike  10' 10'     BOSU squats                                      Ther Activity                       Gait Training                       Modalities

## 2021-05-13 ENCOUNTER — OFFICE VISIT (OUTPATIENT)
Dept: PHYSICAL THERAPY | Age: 46
End: 2021-05-13
Payer: OTHER MISCELLANEOUS

## 2021-05-13 DIAGNOSIS — R20.0 NUMBNESS AND TINGLING OF RIGHT LEG: Primary | ICD-10-CM

## 2021-05-13 DIAGNOSIS — R20.2 NUMBNESS AND TINGLING OF RIGHT LEG: Primary | ICD-10-CM

## 2021-05-13 DIAGNOSIS — R20.0 NUMBNESS OF RIGHT FOOT: ICD-10-CM

## 2021-05-13 PROCEDURE — 97112 NEUROMUSCULAR REEDUCATION: CPT | Performed by: PHYSICAL THERAPIST

## 2021-05-13 PROCEDURE — 97140 MANUAL THERAPY 1/> REGIONS: CPT | Performed by: PHYSICAL THERAPIST

## 2021-05-13 NOTE — PROGRESS NOTES
Daily Note     Today's date: 2021  Patient name: Vince Zarco  : [birth year]  MRN: [id number]  Referring provider: Margot Mujica MD  Dx:   Encounter Diagnosis     ICD-10-CM    1  Numbness and tingling of right leg  R20 0     R20 2    2  Numbness of right foot  R20 0                   Subjective: pt reports back of leg is doing better today  Reports right sided low back discomfort entering session and he removed tape at noon and numbness returned in leg  Symptoms relief lasts about 2-3 days at a time  Objective: See treatment diary below    Assessment: Patient reported no symptoms post treatment today  Cramping occurred with short foot exercise and cueing needed to avoid compensation  Plan: Continue per plan of care  Progress treatment as tolerated  Patient provided verbal consent to treatment plan, grade 5 joint mobilization, and recommended interventions  Manuals 4 27 4 29 5 6 21   Central gliders L5-S1 JF with lumbar gapping FB  FB      tibial tens  In S/L JF FB FB VK FB   NP-STM sciatic/peroneal JF FB      rocktape sciatic nerve JF FB  FB FB   IASTM R proximal hamstring insertion    VK    Sciatic nerve glides    VK FB   L5 gr  5     FB    IASTM peroneal n  JF FB FB     Neuro Re-Ed        Fwd heel/side tap   2*10  2*10   rockerboard   2*10 A/P lvl 2     rockerboard   2*10 m/l lvl 2     S/L deadlift   2*10     Standing short foot     2*10, 10'' hold            Ther Ex 4 27 4 29 5 6  5 13   Pt edu          Tibial tensioners 2*10 tensioner 2*10 tensioner      recum bike  10' 10'   5'   BOSU squats                                           Ther Activity                          Gait Training                          Modalities

## 2021-05-18 ENCOUNTER — OFFICE VISIT (OUTPATIENT)
Dept: PHYSICAL THERAPY | Age: 46
End: 2021-05-18
Payer: OTHER MISCELLANEOUS

## 2021-05-18 DIAGNOSIS — R20.2 NUMBNESS AND TINGLING OF RIGHT LEG: Primary | ICD-10-CM

## 2021-05-18 DIAGNOSIS — R20.0 NUMBNESS AND TINGLING OF RIGHT LEG: Primary | ICD-10-CM

## 2021-05-18 DIAGNOSIS — R20.0 NUMBNESS OF RIGHT FOOT: ICD-10-CM

## 2021-05-18 PROCEDURE — 97110 THERAPEUTIC EXERCISES: CPT

## 2021-05-18 PROCEDURE — 97140 MANUAL THERAPY 1/> REGIONS: CPT

## 2021-05-18 PROCEDURE — 97112 NEUROMUSCULAR REEDUCATION: CPT

## 2021-05-18 NOTE — PROGRESS NOTES
Daily Note     Today's date: 2021  Patient name: Salina Willard  :   MRN: [id number]  Referring provider: Riley Pratt MD  Dx:   Encounter Diagnosis     ICD-10-CM    1  Numbness and tingling of right leg  R20 0     R20 2    2  Numbness of right foot  R20 0                   Subjective: pt reports he has relief after treatment which lasts 2-3 days    Objective: See treatment diary below  Pt ed:pt/girlfriend were educated on kinesiotaping for sciatic nerve unloading      Assessment: decreased sx noted after treatment, pt able to perform short foot with less cramping with visual/tactile cueing, pt/girlfriend have good understanding of kinesiotape application      Plan: Continue per plan of care  Progress treatment as tolerated  Manuals 4 29 5 6 21   Central gliders L5-S1 FB  FB       tibial tens  In S/L FB FB VK FB FB   NP-STM sciatic/peroneal FB       rocktape sciatic nerve FB  FB FB FB   IASTM R proximal hamstring insertion   VK     Sciatic nerve glides   VK FB FB   L5 gr  5    FB FB    IASTM peroneal n  FB FB      Neuro Re-Ed     21   Fwd heel/side tap  2*10  2*10 2x10   rockerboard  2*10 A/P lvl 2      rockerboard  2*10 m/l lvl 2      S/L deadlift  2*10      Standing short foot    2*10, 10'' hold [id number]"    SLS rebounder ball toss     3x30" ea   Ther Ex 4 29 5 6  5 13 21   Pt edu          Tibial tensioners 2*10 tensioner       recum bike  10'   5' 5'    BOSU squats     10x8"                                      Ther Activity                          Gait Training                          Modalities

## 2021-05-20 ENCOUNTER — OFFICE VISIT (OUTPATIENT)
Dept: PHYSICAL THERAPY | Age: 46
End: 2021-05-20
Payer: OTHER MISCELLANEOUS

## 2021-05-20 DIAGNOSIS — R20.0 NUMBNESS AND TINGLING OF RIGHT LEG: Primary | ICD-10-CM

## 2021-05-20 DIAGNOSIS — R20.0 NUMBNESS OF RIGHT FOOT: ICD-10-CM

## 2021-05-20 DIAGNOSIS — R20.2 NUMBNESS AND TINGLING OF RIGHT LEG: Primary | ICD-10-CM

## 2021-05-20 PROCEDURE — 97112 NEUROMUSCULAR REEDUCATION: CPT | Performed by: PHYSICAL THERAPIST

## 2021-05-20 PROCEDURE — 97140 MANUAL THERAPY 1/> REGIONS: CPT | Performed by: PHYSICAL THERAPIST

## 2021-05-20 PROCEDURE — 97110 THERAPEUTIC EXERCISES: CPT | Performed by: PHYSICAL THERAPIST

## 2021-05-20 NOTE — PROGRESS NOTES
Plan of care    Today's date: 2021  Patient name: Karishma Vogt  :   MRN: [id number]  Referring provider: Yusuf Jose MD  Dx:   Encounter Diagnosis     ICD-10-CM    1  Numbness and tingling of right leg  R20 0     R20 2    2  Numbness of right foot  R20 0        Start Time: 0930  Stop Time: 1015  Total time in clinic (min): 45 minutes    Subjective: pt reports he is feeling pretty good lately with less heaviness in his foot  Objective: See treatment diary below  Pt ed:pt/girlfriend were educated on kinesiotaping for sciatic nerve unloading    Assessment: Patient reported feeling good post treatment  Fatigue noted with single leg based exercise  Plan: 8 weeks, 2 visits per week  Manuals 5 6 21   Central gliders L5-S1 FB        tibial tens  In S/L FB VK FB FB FB   NP-STM sciatic/peroneal     FB   rocktape sciatic nerve  FB FB FB FB   IASTM R proximal hamstring insertion  VK      Sciatic nerve glides  VK FB FB FB   L5 gr  5   FB FB     IASTM peroneal n  FB    FB   Neuro Re-Ed    21   Fwd heel/side tap 2*10  2*10 2x10    rockerboard 2*10 A/P lvl 2       rockerboard 2*10 m/l lvl 2       S/L deadlift 2*10       Standing short foot   2*10, 10'' hold [id number]" 2*10, 10''    SLS rebounder ball toss    3x30 3*30    Ther Ex 5 6  5 13 21   Pt edu          Tibial tensioners        recum bike    5' 5'  5'   BOSU squats    10x8" 2*10, 10"                                      Ther Activity                          Gait Training                          Modalities

## 2021-05-25 ENCOUNTER — OFFICE VISIT (OUTPATIENT)
Dept: PHYSICAL THERAPY | Age: 46
End: 2021-05-25
Payer: OTHER MISCELLANEOUS

## 2021-05-25 DIAGNOSIS — R20.2 NUMBNESS AND TINGLING OF RIGHT LEG: Primary | ICD-10-CM

## 2021-05-25 DIAGNOSIS — R20.0 NUMBNESS OF RIGHT FOOT: ICD-10-CM

## 2021-05-25 DIAGNOSIS — R20.0 NUMBNESS AND TINGLING OF RIGHT LEG: Primary | ICD-10-CM

## 2021-05-25 PROCEDURE — 97112 NEUROMUSCULAR REEDUCATION: CPT | Performed by: PHYSICAL THERAPIST

## 2021-05-25 PROCEDURE — 97110 THERAPEUTIC EXERCISES: CPT | Performed by: PHYSICAL THERAPIST

## 2021-05-25 PROCEDURE — 97140 MANUAL THERAPY 1/> REGIONS: CPT | Performed by: PHYSICAL THERAPIST

## 2021-05-25 NOTE — PROGRESS NOTES
Plan of care    Today's date: 2021  Patient name: Kandi Barreto  :   MRN: [id number]  Referring provider: Andrzej Otoole MD  Dx:   Encounter Diagnosis     ICD-10-CM    1  Numbness and tingling of right leg  R20 0     R20 2    2  Numbness of right foot  R20 0                   Subjective: pt reports foot has been doing better overall  Reports taping intervention really helps  Objective: See treatment diary below    Assessment: Patient reported absence of symptoms with session  Difficulty observed with balance based exercises  Plan: Continue per plan of care  Manuals 21   Central gliders L5-S1         tibial tens  In S/L VK FB FB FB FB   NP-STM sciatic/peroneal    FB FB   rocktape sciatic nerve FB FB FB FB FB   IASTM R proximal hamstring insertion VK    FB   Sciatic nerve glides VK FB FB FB FB   L5 gr  5  FB FB  FB    IASTM peroneal n     FB    Neuro Re-Ed   21   Fwd heel/side tap  2*10 2x10  3way kickers YTB   Standing short foot  2*10, 10'' hold [id number]" 2*10, 10'' 2*10, 10''    SLS rebounder ball toss   3x30 3*30     Ther Ex  21   Pt edu          recum bike   5' 5'  5' 5'   BOSU squats   10x8" 2*10, 10" 3*10   3 way kickers     3*10 YTB    side lunge onto BOSU     2*10                     Ther Activity                          Gait Training                          Modalities

## 2021-05-27 ENCOUNTER — APPOINTMENT (OUTPATIENT)
Dept: PHYSICAL THERAPY | Age: 46
End: 2021-05-27
Payer: OTHER MISCELLANEOUS

## 2021-06-01 ENCOUNTER — OFFICE VISIT (OUTPATIENT)
Dept: PHYSICAL THERAPY | Age: 46
End: 2021-06-01
Payer: OTHER MISCELLANEOUS

## 2021-06-01 ENCOUNTER — TELEPHONE (OUTPATIENT)
Dept: NEUROLOGY | Facility: CLINIC | Age: 46
End: 2021-06-01

## 2021-06-01 ENCOUNTER — APPOINTMENT (OUTPATIENT)
Dept: LAB | Age: 46
End: 2021-06-01
Payer: OTHER MISCELLANEOUS

## 2021-06-01 DIAGNOSIS — R20.0 NUMBNESS OF RIGHT FOOT: Primary | ICD-10-CM

## 2021-06-01 DIAGNOSIS — R20.2 NUMBNESS AND TINGLING OF RIGHT LEG: ICD-10-CM

## 2021-06-01 DIAGNOSIS — R20.0 NUMBNESS AND TINGLING OF RIGHT LEG: ICD-10-CM

## 2021-06-01 LAB
25(OH)D3 SERPL-MCNC: 12.2 NG/ML (ref 30–100)
ALBUMIN SERPL BCP-MCNC: 4.1 G/DL (ref 3.5–5)
ALP SERPL-CCNC: 62 U/L (ref 46–116)
ALT SERPL W P-5'-P-CCNC: 54 U/L (ref 12–78)
ANION GAP SERPL CALCULATED.3IONS-SCNC: 3 MMOL/L (ref 4–13)
AST SERPL W P-5'-P-CCNC: 22 U/L (ref 5–45)
BASOPHILS # BLD AUTO: 0.05 THOUSANDS/ΜL (ref 0–0.1)
BASOPHILS NFR BLD AUTO: 1 % (ref 0–1)
BILIRUB SERPL-MCNC: 1.04 MG/DL (ref 0.2–1)
BUN SERPL-MCNC: 15 MG/DL (ref 5–25)
CALCIUM SERPL-MCNC: 9.4 MG/DL (ref 8.3–10.1)
CHLORIDE SERPL-SCNC: 104 MMOL/L (ref 100–108)
CO2 SERPL-SCNC: 30 MMOL/L (ref 21–32)
CREAT SERPL-MCNC: 0.77 MG/DL (ref 0.6–1.3)
CRP SERPL QL: 3.4 MG/L
EOSINOPHIL # BLD AUTO: 0.24 THOUSAND/ΜL (ref 0–0.61)
EOSINOPHIL NFR BLD AUTO: 4 % (ref 0–6)
ERYTHROCYTE [DISTWIDTH] IN BLOOD BY AUTOMATED COUNT: 12.6 % (ref 11.6–15.1)
FOLATE SERPL-MCNC: >20 NG/ML (ref 3.1–17.5)
GFR SERPL CREATININE-BSD FRML MDRD: 108 ML/MIN/1.73SQ M
GLUCOSE P FAST SERPL-MCNC: 142 MG/DL (ref 65–99)
HCT VFR BLD AUTO: 45.3 % (ref 36.5–49.3)
HGB BLD-MCNC: 15.4 G/DL (ref 12–17)
IMM GRANULOCYTES # BLD AUTO: 0.01 THOUSAND/UL (ref 0–0.2)
IMM GRANULOCYTES NFR BLD AUTO: 0 % (ref 0–2)
LYMPHOCYTES # BLD AUTO: 1.77 THOUSANDS/ΜL (ref 0.6–4.47)
LYMPHOCYTES NFR BLD AUTO: 30 % (ref 14–44)
MCH RBC QN AUTO: 30.1 PG (ref 26.8–34.3)
MCHC RBC AUTO-ENTMCNC: 34 G/DL (ref 31.4–37.4)
MCV RBC AUTO: 89 FL (ref 82–98)
MONOCYTES # BLD AUTO: 0.48 THOUSAND/ΜL (ref 0.17–1.22)
MONOCYTES NFR BLD AUTO: 8 % (ref 4–12)
NEUTROPHILS # BLD AUTO: 3.33 THOUSANDS/ΜL (ref 1.85–7.62)
NEUTS SEG NFR BLD AUTO: 57 % (ref 43–75)
NRBC BLD AUTO-RTO: 0 /100 WBCS
PLATELET # BLD AUTO: 211 THOUSANDS/UL (ref 149–390)
PMV BLD AUTO: 10.2 FL (ref 8.9–12.7)
POTASSIUM SERPL-SCNC: 4.3 MMOL/L (ref 3.5–5.3)
PROT SERPL-MCNC: 7.7 G/DL (ref 6.4–8.2)
RBC # BLD AUTO: 5.11 MILLION/UL (ref 3.88–5.62)
SODIUM SERPL-SCNC: 137 MMOL/L (ref 136–145)
TSH SERPL DL<=0.05 MIU/L-ACNC: 3.49 UIU/ML (ref 0.36–3.74)
VIT B12 SERPL-MCNC: 462 PG/ML (ref 100–900)
WBC # BLD AUTO: 5.88 THOUSAND/UL (ref 4.31–10.16)

## 2021-06-01 PROCEDURE — 80053 COMPREHEN METABOLIC PANEL: CPT

## 2021-06-01 PROCEDURE — 84446 ASSAY OF VITAMIN E: CPT

## 2021-06-01 PROCEDURE — 86140 C-REACTIVE PROTEIN: CPT

## 2021-06-01 PROCEDURE — 97140 MANUAL THERAPY 1/> REGIONS: CPT | Performed by: PHYSICAL THERAPIST

## 2021-06-01 PROCEDURE — 85025 COMPLETE CBC W/AUTO DIFF WBC: CPT

## 2021-06-01 PROCEDURE — 84207 ASSAY OF VITAMIN B-6: CPT

## 2021-06-01 PROCEDURE — 97110 THERAPEUTIC EXERCISES: CPT | Performed by: PHYSICAL THERAPIST

## 2021-06-01 PROCEDURE — 84443 ASSAY THYROID STIM HORMONE: CPT

## 2021-06-01 PROCEDURE — 97112 NEUROMUSCULAR REEDUCATION: CPT | Performed by: PHYSICAL THERAPIST

## 2021-06-01 PROCEDURE — 82746 ASSAY OF FOLIC ACID SERUM: CPT

## 2021-06-01 PROCEDURE — 84165 PROTEIN E-PHORESIS SERUM: CPT

## 2021-06-01 PROCEDURE — 86618 LYME DISEASE ANTIBODY: CPT

## 2021-06-01 PROCEDURE — 36415 COLL VENOUS BLD VENIPUNCTURE: CPT

## 2021-06-01 PROCEDURE — 84425 ASSAY OF VITAMIN B-1: CPT

## 2021-06-01 PROCEDURE — 82306 VITAMIN D 25 HYDROXY: CPT

## 2021-06-01 PROCEDURE — 84166 PROTEIN E-PHORESIS/URINE/CSF: CPT | Performed by: PSYCHIATRY & NEUROLOGY

## 2021-06-01 PROCEDURE — 82607 VITAMIN B-12: CPT

## 2021-06-01 NOTE — PROGRESS NOTES
Daily Note     Today's date: 2021  Patient name: Josefina Bangura  : [birth year]  MRN: [id number]  Referring provider: Ethel Lyon MD  Dx:   Encounter Diagnosis     ICD-10-CM    1  Numbness of right foot  R20 0    2  Numbness and tingling of right leg  R20 0     R20 2                   Subjective: Patient reports that his foot has been doing better overall with less symptoms and improved ease walking  Reports being able to be more active than prior  Objective: See treatment diary below    Assessment: Tolerated treatment well  Patient reported fatigue with treatment progression today  Instability noted with walking lunges  Plan: Continue per plan of care  Manuals 21    tibial tens  In S/L FB FB FB FB FB   NP-STM sciatic/peroneal   FB FB    rocktape sciatic nerve FB FB FB FB FB   IASTM R proximal hamstring insertion    FB    Sciatic nerve glides FB FB FB FB FB   L5 gr  5 FB FB  FB FB    IASTM peroneal n    FB     Neuro Re-Ed  21   Standing short foot 2*10, 10'' hold [id number]" 2*10, 10'' 2*10, 10'' 2*10, 10"    SLS rebounder ball toss  3x30 3*30   2*20- 3 way   Sneaky lunge     2*10, 3"   Walking lunge     20'x2   Ther Ex 21 6/1   Pt edu          recum bike  5' 5'  5' 5' 5'   BOSU squats  10x8" 2*10, 10" 3*10    3 way kickers    3*10 YTB 3*10 YTB    side lunge onto BOSU    2*10 3*10                    Ther Activity                          Gait Training                          Modalities

## 2021-06-01 NOTE — TELEPHONE ENCOUNTER
Patient made aware to take vit D 5000 IU daily and to f/u with PCP  Patient verbalized understanding  Labs will be forwarded to PCP once they're all finalized

## 2021-06-01 NOTE — TELEPHONE ENCOUNTER
----- Message from Mily Song MD sent at 6/1/2021  4:08 PM EDT -----  Please call the patient regarding his abnormal result   Please advise  the patient to take 5000 International Units of vitamin D every day  And to follow up with family physician regarding low vitamin D level

## 2021-06-02 LAB
ALBUMIN SERPL ELPH-MCNC: 4.68 G/DL (ref 3.5–5)
ALBUMIN SERPL ELPH-MCNC: 61.6 % (ref 52–65)
ALBUMIN UR ELPH-MCNC: 100 %
ALPHA1 GLOB MFR UR ELPH: 0 %
ALPHA1 GLOB SERPL ELPH-MCNC: 0.3 G/DL (ref 0.1–0.4)
ALPHA1 GLOB SERPL ELPH-MCNC: 3.9 % (ref 2.5–5)
ALPHA2 GLOB MFR UR ELPH: 0 %
ALPHA2 GLOB SERPL ELPH-MCNC: 0.74 G/DL (ref 0.4–1.2)
ALPHA2 GLOB SERPL ELPH-MCNC: 9.7 % (ref 7–13)
B BURGDOR IGG+IGM SER-ACNC: 15
B-GLOBULIN MFR UR ELPH: 0 %
BETA GLOB ABNORMAL SERPL ELPH-MCNC: 0.42 G/DL (ref 0.4–0.8)
BETA1 GLOB SERPL ELPH-MCNC: 5.5 % (ref 5–13)
BETA2 GLOB SERPL ELPH-MCNC: 4.8 % (ref 2–8)
BETA2+GAMMA GLOB SERPL ELPH-MCNC: 0.36 G/DL (ref 0.2–0.5)
GAMMA GLOB ABNORMAL SERPL ELPH-MCNC: 1.1 G/DL (ref 0.5–1.6)
GAMMA GLOB MFR UR ELPH: 0 %
GAMMA GLOB SERPL ELPH-MCNC: 14.5 % (ref 12–22)
IGG/ALB SER: 1.6 {RATIO} (ref 1.1–1.8)
PROT PATTERN SERPL ELPH-IMP: NORMAL
PROT PATTERN UR ELPH-IMP: NORMAL
PROT SERPL-MCNC: 7.6 G/DL (ref 6.4–8.2)
PROT UR-MCNC: 9 MG/DL

## 2021-06-03 ENCOUNTER — OFFICE VISIT (OUTPATIENT)
Dept: PHYSICAL THERAPY | Age: 46
End: 2021-06-03
Payer: OTHER MISCELLANEOUS

## 2021-06-03 DIAGNOSIS — R20.0 NUMBNESS AND TINGLING OF RIGHT LEG: ICD-10-CM

## 2021-06-03 DIAGNOSIS — R20.2 NUMBNESS AND TINGLING OF RIGHT LEG: ICD-10-CM

## 2021-06-03 DIAGNOSIS — R20.0 NUMBNESS OF RIGHT FOOT: Primary | ICD-10-CM

## 2021-06-03 PROCEDURE — 97112 NEUROMUSCULAR REEDUCATION: CPT

## 2021-06-03 PROCEDURE — 97140 MANUAL THERAPY 1/> REGIONS: CPT

## 2021-06-03 PROCEDURE — 97110 THERAPEUTIC EXERCISES: CPT

## 2021-06-03 NOTE — PROGRESS NOTES
Daily Note     Today's date: 6/3/2021  Patient name: Lazaro Bashir  :   MRN: [id number]  Referring provider: Gautam Marrufo MD  Dx:   Encounter Diagnosis     ICD-10-CM    1  Numbness of right foot  R20 0    2  Numbness and tingling of right leg  R20 0     R20 2                   Subjective: pt reports he feels like he's walking on pencil at times      Objective: See treatment diary below      Assessment: added met pad in pt's sneakers for stability, pt to remove if any discomfort or difficulties       Plan: Continue per plan of care  Progress treatment as tolerated  Manuals 5/13 5/18/21 5/20 5/25 6/1 6/3/21    tibial tens  In S/L FB FB FB FB FB VK   NP-STM sciatic/peroneal   FB FB     rocktape sciatic nerve FB FB FB FB FB VK   IASTM R proximal hamstring insertion    FB     Sciatic nerve glides FB FB FB FB FB    L5 gr  5 FB FB  FB FB     IASTM peroneal n    FB      Neuro Re-Ed  5/18/21 5/20 5/25 6/1 6/3/21   Standing short foot 2*10, 10'' hold [id number]" 2*10, 10'' 2*10, 10'' 2*10, 10"     SLS rebounder ball toss  3x30 3*30   2*20- 3 way 3 way 2x20 ea   Sneaky lunge     2*10, 3" 2x10x3"   March on BOSU      2x10 fingertip hold            Walking lunge     20'x2 With rotation red med ball 2x20'   Ther Ex 5 13 5/18/21 5/20 5/25 6/1 6/3/21   Pt edu           recum bike  5' 5'  5' 5' 5' 5'   BOSU squats  10x8" 2*10, 10" 3*10     3 way kickers    3*10 YTB 3*10 YTB ytb 3x10    side lunge onto BOSU    2*10 3*10 2x20                      Ther Activity                             Gait Training                             Modalities

## 2021-06-04 LAB
A-TOCOPHEROL VIT E SERPL-MCNC: 7.8 MG/L (ref 7–25.1)
GAMMA TOCOPHEROL SERPL-MCNC: 3.1 MG/L (ref 0.5–5.5)
VIT B6 SERPL-MCNC: 18.4 UG/L (ref 5.3–46.7)

## 2021-06-08 ENCOUNTER — OFFICE VISIT (OUTPATIENT)
Dept: PHYSICAL THERAPY | Age: 46
End: 2021-06-08
Payer: OTHER MISCELLANEOUS

## 2021-06-08 DIAGNOSIS — R20.2 NUMBNESS AND TINGLING OF RIGHT LEG: ICD-10-CM

## 2021-06-08 DIAGNOSIS — R20.0 NUMBNESS AND TINGLING OF RIGHT LEG: ICD-10-CM

## 2021-06-08 DIAGNOSIS — R20.0 NUMBNESS OF RIGHT FOOT: Primary | ICD-10-CM

## 2021-06-08 LAB — VIT B1 BLD-SCNC: 178.5 NMOL/L (ref 66.5–200)

## 2021-06-08 PROCEDURE — 97112 NEUROMUSCULAR REEDUCATION: CPT | Performed by: PHYSICAL THERAPIST

## 2021-06-08 PROCEDURE — 97140 MANUAL THERAPY 1/> REGIONS: CPT | Performed by: PHYSICAL THERAPIST

## 2021-06-08 PROCEDURE — 97110 THERAPEUTIC EXERCISES: CPT | Performed by: PHYSICAL THERAPIST

## 2021-06-08 NOTE — PROGRESS NOTES
Daily Note     Today's date: 2021  Patient name: Stephany López  :   MRN: [id number]  Referring provider: Loraine Prader, MD  Dx:   Encounter Diagnosis     ICD-10-CM    1  Numbness of right foot  R20 0    2  Numbness and tingling of right leg  R20 0     R20 2        Start Time:   Stop Time:   Total time in clinic (min): 45 minutes    Subjective: pt reports less numbness in foot and better with walking  Objective: See treatment diary below    Assessment: Patient challenged by balance exercise progression today  Cueing needed to avoid pronation with standing ball toss exercise  Plan: Continue per plan of care  Progress treatment as tolerated  Manuals 5/20 5/25 6/1 6/3/21 6    tibial tens  In S/L FB FB FB VK    NP-STM sciatic/peroneal FB FB      rocktape sciatic nerve FB FB FB VK FB   IASTM R proximal hamstring insertion  FB      Sciatic nerve glides FB FB FB  FB   L5 gr  5  FB FB  FB    IASTM peroneal n  FB       Neuro Re-Ed 5/20 5/25 6/1 6/3/21 6/8   Standing short foot 2*10, 10'' 2*10, 10'' 2*10, 10"      SLS rebounder ball toss 3*30   2*20- 3 way 3 way 2x20 ea 3 way, 2x20 ea  Sneaky lunge   2*10, 3" 2x10x3" 2*10, 3"   March on BOSU    2x10 fingertip hold            Walking lunge   20'x2 With rotation red med ball 2x20' With rotation red med ball 2x20'   Ther Ex 5/20 5/25 6/1 6/3/21    Pt edu          recum bike  5' 5' 5' 5' 5'   BOSU squats 2*10, 10" 3*10      3 way kickers  3*10 YTB 3*10 YTB ytb 3x10 3*10 RTB ea     side lunge onto BOSU  2*10 3*10 2x20 Step over BOSU 2x20                    Ther Activity                          Gait Training                          Modalities

## 2021-06-10 ENCOUNTER — OFFICE VISIT (OUTPATIENT)
Dept: PHYSICAL THERAPY | Age: 46
End: 2021-06-10
Payer: OTHER MISCELLANEOUS

## 2021-06-10 DIAGNOSIS — R20.0 NUMBNESS AND TINGLING OF RIGHT LEG: ICD-10-CM

## 2021-06-10 DIAGNOSIS — R20.2 NUMBNESS AND TINGLING OF RIGHT LEG: ICD-10-CM

## 2021-06-10 DIAGNOSIS — R20.0 NUMBNESS OF RIGHT FOOT: Primary | ICD-10-CM

## 2021-06-10 PROCEDURE — 97112 NEUROMUSCULAR REEDUCATION: CPT | Performed by: PHYSICAL THERAPIST

## 2021-06-10 PROCEDURE — 97110 THERAPEUTIC EXERCISES: CPT | Performed by: PHYSICAL THERAPIST

## 2021-06-10 PROCEDURE — 97140 MANUAL THERAPY 1/> REGIONS: CPT | Performed by: PHYSICAL THERAPIST

## 2021-06-10 NOTE — PROGRESS NOTES
Daily Note     Today's date: 6/10/2021  Patient name: Lazaro Bashir  :   MRN: [id number]  Referring provider: Gautam Marrufo MD  Dx:   Encounter Diagnosis     ICD-10-CM    1  Numbness of right foot  R20 0    2  Numbness and tingling of right leg  R20 0     R20 2        Start Time:   Stop Time:   Total time in clinic (min): 45 minutes     Subjective: Pt states he's having severe LBP today and reports numbness in the entire R LE  Reports no change in activity to cause it  Objective: See treatment diary below    Assessment: Prone press ups with overpressure, lumbar rotational mobs and long axis distraction relieved numbness in patients R LE  Reported feeling slightly better post treatment  Plan: Continue per plan of care  Progress treatment as tolerated  Manuals 5/25 6/1 6/3/21 6/8 6/10    tibial tens  In S/L FB FB VK     NP-STM sciatic/peroneal FB       rocktape sciatic nerve FB FB VK FB    Sciatic nerve glides FB FB  FB    L5 gr  5 FB FB  FB    P/a mobs     FB L3-4   L3-4 gapping     FB            Neuro Re-Ed 5/25 6/1 6/3/21 6 6/10   Standing short foot 2*10, 10'' 2*10, 10"       SLS rebounder ball toss  2*20- 3 way 3 way 2x20 ea 3 way, 2x20 ea  Sneaky lunge  2*10, 3" 2x10x3" 2*10, 3"    March on BOSU   2x10 fingertip hold     TrA BKFO     2*10 each   palllof press     3*10 RTB ea  Walking lunge  20'x2 With rotation red med ball 2x20' With rotation red med ball 2x20'    Ther Ex 5/25 6/1 6/3/21     Pt edu          recum bike  5' 5' 5' 5' 5'   BOSU squats 3*10       3 way kickers 3*10 YTB 3*10 YTB ytb 3x10 3*10 RTB ea      side lunge onto BOSU 2*10 3*10 2x20 Step over BOSU 2x20    Prone hip extension     1*10 LLE   bridges     2*10, 5"                    Ther Activity                          Gait Training                          Modalities

## 2021-06-14 ENCOUNTER — OFFICE VISIT (OUTPATIENT)
Dept: PHYSICAL THERAPY | Age: 46
End: 2021-06-14
Payer: OTHER MISCELLANEOUS

## 2021-06-14 DIAGNOSIS — R20.2 NUMBNESS AND TINGLING OF RIGHT LEG: ICD-10-CM

## 2021-06-14 DIAGNOSIS — R20.0 NUMBNESS OF RIGHT FOOT: Primary | ICD-10-CM

## 2021-06-14 DIAGNOSIS — R20.0 NUMBNESS AND TINGLING OF RIGHT LEG: ICD-10-CM

## 2021-06-14 PROCEDURE — 97112 NEUROMUSCULAR REEDUCATION: CPT | Performed by: PHYSICAL THERAPIST

## 2021-06-14 PROCEDURE — 97140 MANUAL THERAPY 1/> REGIONS: CPT | Performed by: PHYSICAL THERAPIST

## 2021-06-14 PROCEDURE — 97110 THERAPEUTIC EXERCISES: CPT | Performed by: PHYSICAL THERAPIST

## 2021-06-14 NOTE — PROGRESS NOTES
Daily Note     Today's date: 2021  Patient name: Donnie Peters  :   MRN: [id number]  Referring provider: David aMrvin MD  Dx:   Encounter Diagnosis     ICD-10-CM    1  Numbness of right foot  R20 0    2  Numbness and tingling of right leg  R20 0     R20 2                   Subjective:  Pt reports his lumbar spine is feeling better from the lasts visit  Reports decreased numbness and tingling down the right leg  Objective: See treatment diary below    Assessment: Pt has R foot medial arch collapse with single leg stance activity  Reported feeling better with taping intervention  Plan: Continue per plan of care  Manuals 6/3/21 6/8 6/10 6/14/21    tibial tens  In S/L VK      NP-STM sciatic/peroneal       rocktape sciatic nerve VK FB  FB   Sciatic nerve glides  FB     L5 gr  5  FB     P/a mobs   FB L3-4    L3-4 gapping   FB            Neuro Re-Ed 6/3/21 6/8 6/10 6/14/21   Standing short foot        SLS rebounder ball toss 3 way 2x20 ea 3 way, 2x20 ea  Sneaky lunge 2x10x3" 2*10, 3"     March on BOSU 2x10 fingertip hold      TrA BKFO   2*10 each 2*10 ea  palllof press   3*10 RTB ea  Walking lunge With rotation red med ball 2x20' With rotation red med ball 2x20'  2*10 static   Ther Ex 6/3/21      Pt edu         recum bike  5' 5' 5' 5'   BOSU squats       3 way kickers ytb 3x10 3*10 RTB ea       side lunge onto BOSU 2x20 Step over BOSU 2x20     Prone hip extension   1*10 LLE    bridges   2*10, 5" 2*10, 5"   Side stepping    2*2 laps GTB           Ther Activity                       Gait Training                       Modalities

## 2021-06-18 ENCOUNTER — OFFICE VISIT (OUTPATIENT)
Dept: PHYSICAL THERAPY | Age: 46
End: 2021-06-18
Payer: OTHER MISCELLANEOUS

## 2021-06-18 DIAGNOSIS — R20.0 NUMBNESS OF RIGHT FOOT: Primary | ICD-10-CM

## 2021-06-18 DIAGNOSIS — R20.0 NUMBNESS AND TINGLING OF RIGHT LEG: ICD-10-CM

## 2021-06-18 DIAGNOSIS — R20.2 NUMBNESS AND TINGLING OF RIGHT LEG: ICD-10-CM

## 2021-06-18 PROCEDURE — 97112 NEUROMUSCULAR REEDUCATION: CPT | Performed by: PHYSICAL THERAPIST

## 2021-06-18 PROCEDURE — 97110 THERAPEUTIC EXERCISES: CPT | Performed by: PHYSICAL THERAPIST

## 2021-06-18 PROCEDURE — 97140 MANUAL THERAPY 1/> REGIONS: CPT | Performed by: PHYSICAL THERAPIST

## 2021-06-18 NOTE — PROGRESS NOTES
Daily Note     Today's date: 2021  Patient name: Lazaro Bashir  :   MRN: [id number]  Referring provider: Gautam Marrufo MD  Dx:   Encounter Diagnosis     ICD-10-CM    1  Numbness of right foot  R20 0    2  Numbness and tingling of right leg  R20 0     R20 2        Start Time: 0940  Stop Time: 1025  Total time in clinic (min): 45 minutes    Subjective:  Pt reports his lumbar spine is feeling better from the lasts visit  Reports decreased numbness and tingling down the right leg  Objective: See treatment diary below    Assessment: Pt has decreased numbness in the R foot with lumbar spine mobilizations and core stabilization  Pt has right medial arch collapse with dynamic activity  Pt are improved arch structure with calf raises  Plan: Continue per plan of care  Manuals 6/3/21 6/8 6/10 6/14/21 6/17/21    tibial tens  In S/L VK       NP-STM sciatic/peroneal        rocktape sciatic nerve VK FB  FB    Sciatic nerve glides  FB      L5 gr  5  FB   FB/KF   P/a mobs   FB L3-4  KF L4-5   L3-4 gapping   FB              Neuro Re-Ed 6/3/21 6/8 6/10 6/14/21 6/17/21   Standing short foot         SLS rebounder ball toss 3 way 2x20 ea 3 way, 2x20 ea  3 way, 2x20 ea  foam   Sneaky lunge 2x10x3" 2*10, 3"      March on BOSU 2x10 fingertip hold       TrA BKFO   2*10 each 2*10 ea  2*10 ea  palllof press   3*10 RTB ea  Walking lunge With rotation red med ball 2x20' With rotation red med ball 2x20'  2*10 static    Ther Ex 6/3/21       Pt edu          recum bike  5' 5' 5' 5' 5'   BOSU squats        3 way kickers ytb 3x10 3*10 RTB ea        side lunge onto BOSU 2x20 Step over BOSU 2x20      Prone hip extension   1*10 LLE  2*10 LLE   bridges   2*10, 5" 2*10, 5" 2*10, 5'   Side stepping    2*2 laps GTB 2*2 laps    calf raises     2*10   Ther Activity                          Gait Training                          Modalities

## 2021-06-25 ENCOUNTER — APPOINTMENT (OUTPATIENT)
Dept: PHYSICAL THERAPY | Age: 46
End: 2021-06-25
Payer: OTHER MISCELLANEOUS

## 2021-07-09 NOTE — PROGRESS NOTES
Patient self discharged  Was to contact clinic to set up more appts but no appts made  D/c from therapy at this time